# Patient Record
(demographics unavailable — no encounter records)

---

## 2024-10-23 NOTE — DISCUSSION/SUMMARY
[FreeTextEntry1] : Mr. Reed is an 83-year-old male, former smoker, with history of MI (2000), CAD s/p cardiac stents, s/p defibrillator, HLD, DM, HTN, Hypothyroidism who presents today for initial evaluation of one brief episode of vertigo. No focal neurological deficits on exam. CTH with no acute pathology, unable to get MRI due to defibrillator. Given findings of bilateral carotid plaque on CT soft tissue neck, will obtain bilateral carotid US for further evaluation. Patient has not had an episode of vertigo since August but will monitor for recurrence. I provided him with a new vestibular therapy script. Hopefully this will improve his balance and prevent future falls. Plan to follow up in 1 month to review results and evaluate for recurrence of vertigo. All of the patient's questions and concerns were addressed.  1 year follow up carotid US  if need sooner let me know

## 2024-10-23 NOTE — HISTORY OF PRESENT ILLNESS
[FreeTextEntry1] : Mr. Reed is an 83-year-old male, former smoker, with history of MI (2000), CAD s/p cardiac stents, s/p defibrillator, HLD, DM, HTN, Hypothyroidism who presents today for follow up evaluation of vertigo.  Feeling well overall. Vertigo? No more vertigo He continues to notice imbalance when bending over to get something. Falls? No falls  Vestibular therapy? willing to give a try for imbalance  US?-  slowly worsening short term memory over the years, sometimes forgets word wants to say, names

## 2024-10-23 NOTE — PHYSICAL EXAM
[FreeTextEntry1] : GENERAL PHYSICAL EXAM: GEN: no distress, normal affect  NEUROLOGICAL EXAM: Mental Status Orientation: alert and oriented to person, place, time, and situation Serial 7s intact Able to spell WORLD forward and backwards Immediate recall 3/3 Delayed recall 2/3 Language: clear and fluent, intact comprehension and repetition. No dysarthria.   Cranial Nerves II: visual fields full to confrontation III, IV, VI: PERRL, EOMI V, VII: facial sensation and movement intact and symmetric VIII: hearing intact IX, X: uvula midline, soft palate elevates normally XI: BL shoulder shrug intact XII: tongue midline   Motor Shoulder abduction: 5 (R), 5 (L) UE flexion: 5 (R), 5 (L) UE extension: 5 (R), 5 (L) Hand : 5 (R), 5 (L) Hip flexion: 5 (R), 5 (L) Knee flexion: 5 (R), 5 (L) Knee extension: 5 (R), 5 (L) Dorsiflexion: 5 (R), 5 (L) Plantar flexion: 5 (R), 5 (L)   Tone and bulk are normal in upper and lower limbs No pronator drift   Sensation Intact to light touch in all 4 EXTs. No extinction.   Coordination Normal FTN bilaterally Able to perform rapid, alternating movements   Gait Normal station and ambulation with no significant imbalance Negative Romberg
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
## 2025-05-19 NOTE — DISCUSSION/SUMMARY
[FreeTextEntry1] : Mr. Reed is an 84-year-old male, former smoker, with history of MI (2000), CAD s/p cardiac stents on Plavix, s/p defibrillator, HLD, DM, HTN, Hypothyroidism who presents today for follow-up evaluation of bilateral carotid artery stenosis and imbalance.  Exam reveals no focal neurological deficits. Carotid doppler 5/8/2025 with stable findings. Patient has been doing well neurologically with improvement in his balance with therapy.  Will plan to monitor his bilateral carotid stenosis with repeat study in 6 months and follow-up after to review findings, or sooner if needed. Patient was educated about signs and symptoms of stroke and was counseled to contact 911 upon emergence of stroke-like symptoms. Intravenous thrombolysis and mechanical thrombectomy was also counseled and educated to the patient today. All the patient's questions and concerns were addressed.

## 2025-05-19 NOTE — PHYSICAL EXAM
[FreeTextEntry1] : GENERAL PHYSICAL EXAM: GEN: no distress, normal affect  NEUROLOGICAL EXAM: Mental Status Orientation: alert and oriented to person, place, time, and situation Language: clear and fluent, intact comprehension and repetition. No dysarthria.   Cranial Nerves II: visual fields full to confrontation III, IV, VI: PERRL, EOMI V, VII: facial sensation and movement intact and symmetric VIII: hearing intact IX, X: uvula midline, soft palate elevates normally XI: BL shoulder shrug intact XII: tongue midline   Motor Shoulder abduction: 5 (R), 5 (L) UE flexion: 5 (R), 5 (L) UE extension: 5 (R), 5 (L) Hand : 5 (R), 5 (L) Hip flexion: 5 (R), 5 (L) Knee flexion: 5 (R), 5 (L) Knee extension: 5 (R), 5 (L)   Tone and bulk are normal in upper and lower limbs No pronator drift   Sensation Intact to light touch in all 4 EXTs. No extinction.   Coordination Normal FTN bilaterally Able to perform rapid, alternating movements   Gait Normal station and ambulation with no significant imbalance Negative Romberg